# Patient Record
Sex: MALE | Race: OTHER | HISPANIC OR LATINO | Employment: FULL TIME | URBAN - METROPOLITAN AREA
[De-identification: names, ages, dates, MRNs, and addresses within clinical notes are randomized per-mention and may not be internally consistent; named-entity substitution may affect disease eponyms.]

---

## 2022-04-04 ENCOUNTER — OFFICE VISIT (OUTPATIENT)
Dept: FAMILY MEDICINE CLINIC | Facility: CLINIC | Age: 52
End: 2022-04-04

## 2022-04-04 VITALS
HEART RATE: 84 BPM | BODY MASS INDEX: 33.59 KG/M2 | TEMPERATURE: 98 F | DIASTOLIC BLOOD PRESSURE: 80 MMHG | OXYGEN SATURATION: 98 % | SYSTOLIC BLOOD PRESSURE: 130 MMHG | HEIGHT: 67 IN | WEIGHT: 214 LBS | RESPIRATION RATE: 20 BRPM

## 2022-04-04 DIAGNOSIS — G47.33 OSA (OBSTRUCTIVE SLEEP APNEA): ICD-10-CM

## 2022-04-04 DIAGNOSIS — Z00.01 ENCOUNTER FOR GENERAL ADULT MEDICAL EXAMINATION WITH ABNORMAL FINDINGS: Primary | ICD-10-CM

## 2022-04-04 DIAGNOSIS — Z12.11 COLON CANCER SCREENING: ICD-10-CM

## 2022-04-04 DIAGNOSIS — F52.4 PREMATURE EJACULATION: ICD-10-CM

## 2022-04-04 PROCEDURE — 99386 PREV VISIT NEW AGE 40-64: CPT | Performed by: FAMILY MEDICINE

## 2022-04-04 RX ORDER — ESCITALOPRAM OXALATE 5 MG/1
5 TABLET ORAL DAILY
Qty: 30 TABLET | Refills: 3 | Status: SHIPPED | OUTPATIENT
Start: 2022-04-04

## 2022-04-04 NOTE — PROGRESS NOTES
Assessment/Plan:    No problem-specific Assessment & Plan notes found for this encounter  Diagnoses and all orders for this visit:    Encounter for general adult medical examination with abnormal findings  -     CBC and differential; Future  -     Comprehensive metabolic panel; Future  -     Lipid panel; Future    BMI 33 0-33 9,adult    Colon cancer screening    Premature ejaculation  -     escitalopram (LEXAPRO) 5 mg tablet; Take 1 tablet (5 mg total) by mouth daily    PRECIOUS (obstructive sleep apnea)          Subjective:      Patient ID: Nicole Newman is a 46 y o  male  HPI  Patient is here for PE, not having any acute distress  The following portions of the patient's history were reviewed and updated as appropriate: allergies, current medications, past family history, past medical history, past social history, past surgical history and problem list     Review of Systems   Constitutional: Negative for diaphoresis, fatigue, fever and unexpected weight change  Respiratory: Negative for apnea, cough, choking, chest tightness and shortness of breath  Cardiovascular: Negative for chest pain, palpitations and leg swelling  Gastrointestinal: Negative for abdominal distention, abdominal pain, anal bleeding, blood in stool and constipation  Musculoskeletal: Negative for arthralgias, back pain, gait problem and joint swelling  Neurological: Negative for dizziness, facial asymmetry, light-headedness and headaches  Psychiatric/Behavioral: Negative for behavioral problems, dysphoric mood and self-injury  The patient is not nervous/anxious  Objective:      /80 (BP Location: Left arm, Patient Position: Sitting, Cuff Size: Standard)   Pulse 84   Temp 98 °F (36 7 °C) (Temporal)   Resp 20   Ht 5' 7" (1 702 m)   Wt 97 1 kg (214 lb)   SpO2 98%   BMI 33 52 kg/m²          Physical Exam  Vitals and nursing note reviewed  Neck:      Thyroid: No thyroid mass or thyromegaly        Vascular: No carotid bruit or JVD  Trachea: No tracheal tenderness  Comments: 18" NC  Cardiovascular:      Rate and Rhythm: Normal rate and regular rhythm  No extrasystoles are present  Pulses: Normal pulses  Heart sounds: Normal heart sounds  Heart sounds not distant  No friction rub  Pulmonary:      Effort: Pulmonary effort is normal  No tachypnea or bradypnea  Breath sounds: Normal breath sounds  No stridor  Abdominal:      General: Bowel sounds are normal  There is no abdominal bruit  Palpations: Abdomen is soft  There is no hepatomegaly or splenomegaly  Hernia: No hernia is present  Comments: 40" AC   Genitourinary:     Prostate: Normal    Musculoskeletal:         General: Normal range of motion  Cervical back: No edema or rigidity  Skin:     General: Skin is warm and dry  Neurological:      Mental Status: He is oriented to person, place, and time  Deep Tendon Reflexes: Reflexes are normal and symmetric  Psychiatric:         Behavior: Behavior normal          Thought Content: Thought content normal          Judgment: Judgment normal          BMI Counseling: Body mass index is 33 52 kg/m²  The BMI is above normal  Nutrition recommendations include decreasing soda and/or juice intake, moderation in carbohydrate intake and increasing intake of lean protein  Exercise recommendations include exercising 3-5 times per week

## 2023-04-24 ENCOUNTER — OFFICE VISIT (OUTPATIENT)
Dept: FAMILY MEDICINE CLINIC | Facility: CLINIC | Age: 53
End: 2023-04-24

## 2023-04-24 VITALS
HEIGHT: 67 IN | TEMPERATURE: 96.8 F | SYSTOLIC BLOOD PRESSURE: 138 MMHG | WEIGHT: 213 LBS | DIASTOLIC BLOOD PRESSURE: 80 MMHG | BODY MASS INDEX: 33.43 KG/M2 | OXYGEN SATURATION: 97 % | HEART RATE: 76 BPM

## 2023-04-24 DIAGNOSIS — Z72.0 TOBACCO USE: ICD-10-CM

## 2023-04-24 DIAGNOSIS — Z12.11 SCREENING FOR COLON CANCER: ICD-10-CM

## 2023-04-24 DIAGNOSIS — R03.0 ELEVATED BLOOD PRESSURE READING IN OFFICE WITHOUT DIAGNOSIS OF HYPERTENSION: ICD-10-CM

## 2023-04-24 DIAGNOSIS — Z13.6 SCREENING FOR CARDIOVASCULAR CONDITION: ICD-10-CM

## 2023-04-24 DIAGNOSIS — Z11.59 NEED FOR HEPATITIS C SCREENING TEST: ICD-10-CM

## 2023-04-24 DIAGNOSIS — Z12.5 SCREENING FOR MALIGNANT NEOPLASM OF PROSTATE: ICD-10-CM

## 2023-04-24 DIAGNOSIS — Z00.00 ANNUAL PHYSICAL EXAM: Primary | ICD-10-CM

## 2023-04-24 DIAGNOSIS — Z11.4 SCREENING FOR HIV (HUMAN IMMUNODEFICIENCY VIRUS): ICD-10-CM

## 2023-04-24 DIAGNOSIS — F52.4 PREMATURE EJACULATION: ICD-10-CM

## 2023-04-24 RX ORDER — ESCITALOPRAM OXALATE 10 MG/1
10 TABLET ORAL DAILY
Qty: 30 TABLET | Refills: 5 | Status: SHIPPED | OUTPATIENT
Start: 2023-04-24 | End: 2023-10-21

## 2023-04-24 NOTE — PROGRESS NOTES
ADULT ANNUAL [address] [facility] PRIMARY CARE [facility]    NAME: Derek Vila  AGE: 46 y o  SEX: male  : 1970     DATE: 2023     Assessment and Plan:     Problem List Items Addressed This Visit        Other    Elevated blood pressure reading in office without diagnosis of hypertension     Elevated BP on exam, improved with repeat, recommend smoking cessation and DASH diet, follow-up for BP check, suspect underlying HTN, recommend low salt diet as well, patient admits to high salt diet  Premature ejaculation     Previously started on Lexapro 5 mg with minimal improvement  Start trial of increased dose to 10 mg once daily, follow-up in 6-8 weeks if no improvement  Relevant Medications    escitalopram (LEXAPRO) 10 mg tablet    Other Relevant Orders    Testosterone, free, total    Tobacco use     Smokes hookah and cigars 2-3 times a week  Recommend smoking cessation, patient not agreeable as he does not smoke cigarettes  Re-address smoking cessation next visit  Other Visit Diagnoses     Annual physical exam    -  Primary    Screening for colon cancer        Relevant Orders    Occult Blood, Fecal Immunochemical    Screening for cardiovascular condition        Relevant Orders    CBC and differential    Comprehensive metabolic panel    : HIV 1/2 AB/AG w Reflex [facility] for 2 yr old and above    Hepatitis C antibody    Lipid panel    Occult Blood, Fecal Immunochemical    Need for hepatitis C screening test        Relevant Orders    Hepatitis C antibody    Screening for HIV (human immunodeficiency virus)        Relevant Orders    : HIV 1/2 AB/AG w Reflex [facility] for 2 yr old and above    Screening for malignant neoplasm of prostate        Relevant Orders    PSA, Total Screen          Immunizations and preventive care screenings were discussed with patient today   Appropriate education was printed on patient's after visit summary  Discussed risks and benefits of prostate cancer screening  We discussed the controversial history of PSA screening for prostate cancer in the [place] as well as the risk of over detection and over treatment of prostate cancer by way of PSA screening  The patient understands that PSA blood testing is an imperfect way to screen for prostate cancer and that elevated PSA levels in the blood may also be caused by infection, inflammation, prostatic trauma or manipulation, urological procedures, or by benign prostatic enlargement  The role of the digital rectal examination in prostate cancer screening was also discussed and I discussed with him that there is large interobserver variability in the findings of digital rectal examination  Counseling:  Alcohol/drug use: discussed moderation in alcohol intake, the recommendations for healthy alcohol use, and avoidance of illicit drug use  Dental Health: discussed importance of regular tooth brushing, flossing, and dental visits  Injury prevention: discussed safety/seat belts, safety helmets, smoke detectors, carbon dioxide detectors, and smoking near bedding or upholstery  Sexual health: discussed sexually transmitted diseases, partner selection, use of condoms, avoidance of unintended pregnancy, and contraceptive alternatives  · Exercise: the importance of regular exercise/physical activity was discussed  Recommend exercise 3-5 times per week for at least 30 minutes  Return in about 6 months (around 10/24/2023)  Chief Complaint:     Chief Complaint   Patient presents with   • Physical Exam      History of Present Illness:     Adult Annual Physical   Patient here for a comprehensive physical exam  The patient reports no problems  Snoring a lot a night  Not tired during the day  Noticed by wife but does not bother him  R hand in the morning painful  OTC analgesics help  Working in [employer]  Smoking hookah, cigars 2-3x a week   No cigarettes, never smoked them  Working long hours as boss at [employer]  Does not have insurance but willing to pay for things out of pocket, wants natural remedy for low testosterone? No frequent ETOH use  History was conducted in Latvian without the use of   Diet and Physical Activity  · Diet/Nutrition: well balanced diet  Rice, beans, high salt, salad  · Exercise: no formal exercise  Depression Screening  PHQ-2/9 Depression Screening    Little interest or pleasure in doing things: 0 - not at all  Feeling down, depressed, or hopeless: 0 - not at all  PHQ-2 Score: 0  PHQ-2 Interpretation: Negative depression screen       General Health  · Sleep: gets 4-6 hours of sleep on average  · Hearing: normal - bilateral   · Vision: wears glasses  · Dental: brushes teeth twice daily   Health  · Symptoms include: erectile dysfunction     Review of Systems:     Review of Systems   Constitutional: Negative for chills and fever  HENT: Negative for ear pain and sore throat  Eyes: Negative for pain and visual disturbance  Respiratory: Negative for cough and shortness of breath  Cardiovascular: Negative for chest pain and palpitations  Gastrointestinal: Negative for abdominal pain and vomiting  Genitourinary: Negative for dysuria and hematuria  Musculoskeletal: Positive for arthralgias  Negative for back pain and neck pain  Skin: Negative for color change and rash  Neurological: Negative for seizures and syncope  All other systems reviewed and are negative  Past Medical History:     Past Medical History:   Diagnosis Date   • Allergic    • Hypertension    • Obesity       Past Surgical History:     History reviewed  No pertinent surgical history     Family History:     Family History   Problem Relation Age of Onset   • Cancer Mother    • Hypertension Father       Social History:     Social History     Socioeconomic History   • Marital status: /Civil Union "Spouse name: None   • Number of children: None   • Years of education: None   • Highest education level: None   Occupational History   • None   Tobacco Use   • Smoking status: Every Day     Types: Cigars     Start date: 4/21/2020   • Smokeless tobacco: Never   Substance and Sexual Activity   • Alcohol use: Yes   • Drug use: Never   • Sexual activity: Yes     Partners: Female   Other Topics Concern   • None   Social History Narrative   • None     Social Determinants of Health     Financial Resource Strain: Not on file   Food Insecurity: Not on file   Transportation Needs: Not on file   Physical Activity: Not on file   Stress: Not on file   Social Connections: Not on file   Intimate Partner Violence: Not on file   Housing Stability: Not on file      Current Medications:     Current Outpatient Medications   Medication Sig Dispense Refill   • escitalopram (LEXAPRO) 10 mg tablet Take 1 tablet (10 mg total) by mouth daily Candy 1 tableta cada audrey 30 tablet 5     No current facility-administered medications for this visit  Allergies:     No Known Allergies   Physical Exam:     /80 (BP Location: Left arm, Patient Position: Sitting, Cuff Size: Large)   Pulse 76   Temp (!) 96 8 °F (36 °C) (Tympanic)   Ht 5' 7\" (1 702 m)   Wt 96 6 kg (213 lb)   SpO2 97%   BMI 33 36 kg/m²     Physical Exam  Vitals and nursing note reviewed  Constitutional:       General: He is not in acute distress  Appearance: He is well-developed  He is obese  HENT:      Head: Normocephalic and atraumatic  Right Ear: Tympanic membrane, ear canal and external ear normal       Left Ear: Tympanic membrane, ear canal and external ear normal    Eyes:      Extraocular Movements: Extraocular movements intact  Conjunctiva/sclera: Conjunctivae normal       Pupils: Pupils are equal, round, and reactive to light  Cardiovascular:      Rate and Rhythm: Normal rate and regular rhythm  Heart sounds: No murmur heard    Pulmonary:      " Effort: Pulmonary effort is normal  No respiratory distress  Breath sounds: Normal breath sounds  Abdominal:      Palpations: Abdomen is soft  Musculoskeletal:         General: No swelling  Cervical back: Neck supple  Skin:     General: Skin is warm and dry  Capillary Refill: Capillary refill takes less than 2 seconds  Neurological:      Mental Status: He is alert     Psychiatric:         Mood and Affect: Mood normal           Juana Doyle PA-C  [address]

## 2023-04-25 NOTE — ASSESSMENT & PLAN NOTE
Smokes hookah and cigars 2-3 times a week  Recommend smoking cessation, patient not agreeable as he does not smoke cigarettes  Re-address smoking cessation next visit

## 2023-04-25 NOTE — ASSESSMENT & PLAN NOTE
Previously started on Lexapro 5 mg with minimal improvement  Start trial of increased dose to 10 mg once daily, follow-up in 6-8 weeks if no improvement

## 2023-04-25 NOTE — ASSESSMENT & PLAN NOTE
Elevated BP on exam, improved with repeat, recommend smoking cessation and DASH diet, follow-up for BP check, suspect underlying HTN, recommend low salt diet as well, patient admits to high salt diet

## 2023-05-06 ENCOUNTER — APPOINTMENT (OUTPATIENT)
Dept: LAB | Facility: HOSPITAL | Age: 53
End: 2023-05-06

## 2023-05-06 DIAGNOSIS — Z12.5 SCREENING FOR MALIGNANT NEOPLASM OF PROSTATE: ICD-10-CM

## 2023-05-06 DIAGNOSIS — F52.4 PREMATURE EJACULATION: ICD-10-CM

## 2023-05-06 DIAGNOSIS — Z13.6 SCREENING FOR CARDIOVASCULAR CONDITION: ICD-10-CM

## 2023-05-06 DIAGNOSIS — Z11.59 NEED FOR HEPATITIS C SCREENING TEST: ICD-10-CM

## 2023-05-06 DIAGNOSIS — Z11.4 SCREENING FOR HIV (HUMAN IMMUNODEFICIENCY VIRUS): ICD-10-CM

## 2023-05-06 LAB
ALBUMIN SERPL BCP-MCNC: 4.5 G/DL (ref 3.5–5)
ALP SERPL-CCNC: 44 U/L (ref 34–104)
ALT SERPL W P-5'-P-CCNC: 17 U/L (ref 7–52)
ANION GAP SERPL CALCULATED.3IONS-SCNC: 8 MMOL/L (ref 4–13)
AST SERPL W P-5'-P-CCNC: 16 U/L (ref 13–39)
BASOPHILS # BLD AUTO: 0.02 THOUSANDS/ÂΜL (ref 0–0.1)
BASOPHILS NFR BLD AUTO: 0 % (ref 0–1)
BILIRUB SERPL-MCNC: 0.88 MG/DL (ref 0.2–1)
BUN SERPL-MCNC: 11 MG/DL (ref 5–25)
CALCIUM SERPL-MCNC: 9 MG/DL (ref 8.4–10.2)
CHLORIDE SERPL-SCNC: 102 MMOL/L (ref 96–108)
CHOLEST SERPL-MCNC: 201 MG/DL
CO2 SERPL-SCNC: 29 MMOL/L (ref 21–32)
CREAT SERPL-MCNC: 0.75 MG/DL (ref 0.6–1.3)
EOSINOPHIL # BLD AUTO: 0.11 THOUSAND/ÂΜL (ref 0–0.61)
EOSINOPHIL NFR BLD AUTO: 2 % (ref 0–6)
ERYTHROCYTE [DISTWIDTH] IN BLOOD BY AUTOMATED COUNT: 12.6 % (ref 11.6–15.1)
GFR SERPL CREATININE-BSD FRML MDRD: 105 ML/MIN/1.73SQ M
GLUCOSE P FAST SERPL-MCNC: 105 MG/DL (ref 65–99)
HCT VFR BLD AUTO: 45.4 % (ref 36.5–49.3)
HCV AB SER QL: NORMAL
HDLC SERPL-MCNC: 43 MG/DL
HGB BLD-MCNC: 15.5 G/DL (ref 12–17)
HIV 1+2 AB+HIV1 P24 AG SERPL QL IA: NORMAL
HIV 2 AB SERPL QL IA: NORMAL
HIV1 AB SERPL QL IA: NORMAL
HIV1 P24 AG SERPL QL IA: NORMAL
IMM GRANULOCYTES # BLD AUTO: 0.01 THOUSAND/UL (ref 0–0.2)
IMM GRANULOCYTES NFR BLD AUTO: 0 % (ref 0–2)
LDLC SERPL CALC-MCNC: 130 MG/DL (ref 0–100)
LYMPHOCYTES # BLD AUTO: 1.4 THOUSANDS/ÂΜL (ref 0.6–4.47)
LYMPHOCYTES NFR BLD AUTO: 27 % (ref 14–44)
MCH RBC QN AUTO: 31.3 PG (ref 26.8–34.3)
MCHC RBC AUTO-ENTMCNC: 34.1 G/DL (ref 31.4–37.4)
MCV RBC AUTO: 92 FL (ref 82–98)
MONOCYTES # BLD AUTO: 0.55 THOUSAND/ÂΜL (ref 0.17–1.22)
MONOCYTES NFR BLD AUTO: 11 % (ref 4–12)
NEUTROPHILS # BLD AUTO: 3.1 THOUSANDS/ÂΜL (ref 1.85–7.62)
NEUTS SEG NFR BLD AUTO: 60 % (ref 43–75)
NONHDLC SERPL-MCNC: 158 MG/DL
NRBC BLD AUTO-RTO: 0 /100 WBCS
PLATELET # BLD AUTO: 256 THOUSANDS/UL (ref 149–390)
PMV BLD AUTO: 10.2 FL (ref 8.9–12.7)
POTASSIUM SERPL-SCNC: 4.6 MMOL/L (ref 3.5–5.3)
PROT SERPL-MCNC: 7.1 G/DL (ref 6.4–8.4)
PSA SERPL-MCNC: 0.8 NG/ML (ref 0–4)
RBC # BLD AUTO: 4.95 MILLION/UL (ref 3.88–5.62)
SODIUM SERPL-SCNC: 139 MMOL/L (ref 135–147)
TRIGL SERPL-MCNC: 141 MG/DL
WBC # BLD AUTO: 5.19 THOUSAND/UL (ref 4.31–10.16)

## 2023-05-08 LAB
TESTOST FREE SERPL-MCNC: 8.3 PG/ML (ref 7.2–24)
TESTOST SERPL-MCNC: 287 NG/DL (ref 264–916)

## 2024-04-29 ENCOUNTER — OFFICE VISIT (OUTPATIENT)
Dept: FAMILY MEDICINE CLINIC | Facility: CLINIC | Age: 54
End: 2024-04-29

## 2024-04-29 ENCOUNTER — LAB (OUTPATIENT)
Dept: LAB | Facility: HOSPITAL | Age: 54
End: 2024-04-29

## 2024-04-29 VITALS
BODY MASS INDEX: 34.69 KG/M2 | TEMPERATURE: 97.9 F | RESPIRATION RATE: 16 BRPM | WEIGHT: 221 LBS | HEART RATE: 80 BPM | HEIGHT: 67 IN | DIASTOLIC BLOOD PRESSURE: 90 MMHG | SYSTOLIC BLOOD PRESSURE: 156 MMHG | OXYGEN SATURATION: 98 %

## 2024-04-29 DIAGNOSIS — I15.8 OTHER SECONDARY HYPERTENSION: ICD-10-CM

## 2024-04-29 DIAGNOSIS — F52.4 PREMATURE EJACULATION: ICD-10-CM

## 2024-04-29 DIAGNOSIS — Z12.11 COLON CANCER SCREENING: ICD-10-CM

## 2024-04-29 DIAGNOSIS — Z00.01 ENCOUNTER FOR GENERAL ADULT MEDICAL EXAMINATION WITH ABNORMAL FINDINGS: Primary | ICD-10-CM

## 2024-04-29 DIAGNOSIS — R06.83 SNORING: ICD-10-CM

## 2024-04-29 LAB — HEMOCCULT STL QL IA: NEGATIVE

## 2024-04-29 PROCEDURE — G0328 FECAL BLOOD SCRN IMMUNOASSAY: HCPCS

## 2024-04-29 PROCEDURE — 99396 PREV VISIT EST AGE 40-64: CPT | Performed by: FAMILY MEDICINE

## 2024-04-29 RX ORDER — ESCITALOPRAM OXALATE 5 MG/1
5 TABLET ORAL DAILY
Qty: 90 TABLET | Refills: 3 | Status: SHIPPED | OUTPATIENT
Start: 2024-04-29 | End: 2025-04-24

## 2024-04-29 RX ORDER — ESCITALOPRAM OXALATE 10 MG/1
10 TABLET ORAL DAILY
Qty: 30 TABLET | Refills: 5 | Status: SHIPPED | OUTPATIENT
Start: 2024-04-29 | End: 2024-04-29 | Stop reason: SDUPTHER

## 2024-04-29 NOTE — PROGRESS NOTES
"Name: Rodger Gates      : 1970      MRN: [id number]  Encounter Provider: Juan José Burgos MD  Encounter Date: 2024   Encounter department: [facility] PRIMARY CARE [facility]    Subjective     Chief Complaint  Patient presents for a routine physical examination with concerns about high blood pressure and weight management.    History of Present Illness  54-year-old patient returns for a follow-up visit. Noted elevated blood pressure readings, with the most recent being 156/90 mmHg. Previous readings include 138/80 mmHg last year and 130/80 mmHg in 2022. The patient has gained approximately 8 pounds since the last visit, which may be contributing to the increased blood pressure. Reports symptoms suggestive of sleep apnea, including snoring and nocturnal awakenings. The patient also mentions frequent urination at night and high levels of stress due to work.    Medications  Patient is not currently on any antihypertensive medications.    Allergies  No known drug allergies.    Past Medical History  History of elevated blood pressure.    Past Surgical History  No surgical history reported.    Social History  Works in web development. High stress levels reported. No tobacco, alcohol, or illicit drug use mentioned.    Family History  No family history provided.    Review of Systems  Positive for nocturia and stress. Negative for chest pain, shortness of breath, and diabetes symptoms.    Vital Signs  /90 (BP Location: Left arm, Patient Position: Sitting, Cuff Size: Standard)   Pulse 80   Temp 97.9 °F (36.6 °C) (Tympanic)   Resp 16   Ht 5' 7\" (1.702 m)   Wt 100 kg (221 lb)   SpO2 98%   BMI 34.61 kg/m²       Physical Exam  Patient is obese with neck circumference of 19-1/2 inches.  Large tongue and narrowed throat .  The heart has normal rhythm without murmurs, lungs are clear to disposition.  Abdomen is protuberant but soft.  Legs no edema.  Rectal exam prostate is " "normal.      Assessment and Plan     1. Encounter for general adult medical examination with abnormal findings  -     CBC and differential; Future  -     Comprehensive metabolic panel; Future  -     Lipid Panel with Direct LDL reflex; Future    2. Other secondary hypertension    3. Snoring (suspect sleep apnea)    4. Premature ejaculation  -     escitalopram (LEXAPRO) 5 mg tablet; Take 1 tablet (5 mg total) by mouth daily Candy 1 tableta cada audrey    5. Colon cancer screening  -     Occult Blood, Fecal Immunochemical; Future        Hypertension: Encourage lifestyle modifications including dietary changes with reduced salt intake, increased physical activity with a goal of 15 minutes of walking daily, and weight loss. Monitor blood pressure weekly and initiate antihypertensive medication if systolic blood pressure consistently exceeds 160 mmHg.  Weight Management: Advise patient to maintain a stable weight and consider nutritional counseling to assist with weight loss. Target weight based on height (5'9\") is 190 pounds.  Suspect Sleep Apnea: Given the patient's symptoms of snoring and nocturnal awakenings, recommend a sleep study to evaluate for obstructive sleep apnea, which may be contributing to hypertension.  Prostate Health: Patient reports no issues with prostate health but will perform a digital rectal exam and consider PSA testing based on age and symptoms.  Colon cancer screening needed.  Recommended to use Fit .  Sexual Health: Patient is using escitalopram 10 mg for hematuria prevention but he wants to decrease it to 5 mg.          Juan José Burgos MD   [facility] PRIMARY CARE [facility]"

## 2024-05-02 NOTE — RESULT ENCOUNTER NOTE
Estimado Rodger: dumont prueba de detección de cáncer de colon realizada recientemente salio Negativa, esto significa que no hay signos de cáncer de colon. Esta prueba es válida por 1 año.  Dr. Burgos

## 2025-03-25 ENCOUNTER — TELEPHONE (OUTPATIENT)
Age: 55
End: 2025-03-25

## 2025-03-25 NOTE — TELEPHONE ENCOUNTER
JUN Used- Luanne- [id number]    Pt is requesting the office visit summary from his last appt to be sent to him in a mychart attachment. He is unable to print the summary from his mychart.    Please advise

## 2025-05-05 ENCOUNTER — OFFICE VISIT (OUTPATIENT)
Dept: FAMILY MEDICINE CLINIC | Facility: CLINIC | Age: 55
End: 2025-05-05

## 2025-05-05 VITALS
BODY MASS INDEX: 34.21 KG/M2 | TEMPERATURE: 98.1 F | SYSTOLIC BLOOD PRESSURE: 130 MMHG | OXYGEN SATURATION: 98 % | RESPIRATION RATE: 16 BRPM | HEIGHT: 67 IN | WEIGHT: 218 LBS | DIASTOLIC BLOOD PRESSURE: 90 MMHG | HEART RATE: 78 BPM

## 2025-05-05 DIAGNOSIS — R73.03 PRE-DIABETES: ICD-10-CM

## 2025-05-05 DIAGNOSIS — L30.9 ECZEMA, UNSPECIFIED TYPE: ICD-10-CM

## 2025-05-05 DIAGNOSIS — N52.9 ERECTILE DYSFUNCTION, UNSPECIFIED ERECTILE DYSFUNCTION TYPE: ICD-10-CM

## 2025-05-05 DIAGNOSIS — Z12.11 COLON CANCER SCREENING: ICD-10-CM

## 2025-05-05 DIAGNOSIS — Z00.01 ENCOUNTER FOR GENERAL ADULT MEDICAL EXAMINATION WITH ABNORMAL FINDINGS: Primary | ICD-10-CM

## 2025-05-05 DIAGNOSIS — F52.4 PREMATURE EJACULATION: ICD-10-CM

## 2025-05-05 PROCEDURE — 99396 PREV VISIT EST AGE 40-64: CPT | Performed by: FAMILY MEDICINE

## 2025-05-05 RX ORDER — ESCITALOPRAM OXALATE 10 MG/1
10 TABLET ORAL DAILY
Qty: 90 TABLET | Refills: 3 | Status: SHIPPED | OUTPATIENT
Start: 2025-05-05 | End: 2026-04-30

## 2025-05-05 RX ORDER — DESOXIMETASONE 2.5 MG/G
CREAM TOPICAL 2 TIMES DAILY
Qty: 60 G | Refills: 2 | Status: SHIPPED | OUTPATIENT
Start: 2025-05-05

## 2025-05-05 NOTE — PROGRESS NOTES
Name: Rodger Gates      : 1970      MRN: [id number]  Encounter Provider: Juan José Burgos MD  Encounter Date: 2025   Encounter department: [facility] CARE [facility]    Assessment & Plan  Encounter for general adult medical examination with abnormal findings  Rodger is here for a physical exam. I found him without any distress. The patient has the following chronic conditions   Patient Active Problem List   Diagnosis    Premature ejaculation    Tobacco use    Elevated blood pressure reading in office without diagnosis of hypertension   .   I recommended that He exercise for at least 3.5 hours each week and maintain a healthy diet low in carbohydrates and saturated fats. I provided her with information about lifestyle modifications. The patient understands the importance of having an annual physical exam.       Suspect Sleep Apnea  Narrow pharynx and neck circumference of 19 inches indicate increased risk for sleep apnea. Home sleep study to evaluate severity.  Treatment plan: Advised to reduce alcohol intake and engage in daily physical activity for at least 15 minutes. If moderate to severe sleep apnea, recommend CPAP machine.    Discontinued order due to lack of insurance.    . Medication Management  Refill for 0.25% skin cream in a 10-gram tube. Increased PE medication dosage to 10 mg for regular use.     Health Maintenance  Advised to lose weight from 218 pounds to below 190 pounds to alleviate snoring. Testosterone level test before 9 AM on Wednesday.  Orders:    CBC and differential; Future    Comprehensive metabolic panel; Future    Lipid Panel with Direct LDL reflex; Future    Pre-diabetes    Orders:    Hemoglobin A1C; Future    Colon cancer screening    Orders:    iFOBT/FIT; Future        Subjective     History of Present Illness  The patient is a 54-year-old male presenting for an annual physical.    He reports no significant health issues, headaches, chest pain,  "constipation, or urinary difficulties. No recent family bereavements. His mother  from uterine cancer, and his father  of natural causes in his 70s. He has 3 children aged 6, 14, and 25. He exercises regularly and consumes alcohol socially, with occasional heavy drinking at night.    He reports frequent snoring and persistent fatigue upon waking, attributed to his work schedule. His wife has observed apnea episodes during sleep.    He uses a 0.25% cream for skin issues, which is effective. He continues Premature Ejaculation (PE) medication and is interested in increasing the dosage to 10 mg daily. No testicular or penile abnormalities, including abnormal discharge. Erectile function is satisfactory, with a slight penile curvature.    SOCIAL HISTORY  Drinks alcohol socially and sometimes heavily at night.    FAMILY HISTORY  Mother  from uterine cancer. Father  of natural causes in his 70s.    MEDICATIONS  Current: Premature Ejaculation (PE) medication    Results       No Known Allergies  Patient Active Problem List   Diagnosis    Premature ejaculation    Tobacco use    Elevated blood pressure reading in office without diagnosis of hypertension       Current Outpatient Medications:     escitalopram (LEXAPRO) 5 mg tablet, Take 1 tablet (5 mg total) by mouth daily Candy 1 tableta cada audrey, Disp: 90 tablet, Rfl: 3    /90 (BP Location: Left arm, Patient Position: Sitting, Cuff Size: Standard)   Pulse 78   Temp 98.1 °F (36.7 °C) (Tympanic)   Resp 16   Ht 5' 7\" (1.702 m)   Wt 98.9 kg (218 lb)   SpO2 98%   BMI 34.14 kg/m²     Physical Exam  General Appearance: Normal.  HEENT: Ears and mouth normal. Narrow pharynx.  Respiratory: Lungs clear through the auscultation.  Cardiovascular: Heart has Normal rhythm without murmurs.  Gastrointestinal: Rectal: Prostate normal size.  Genitourinary: Male: No abnormalities noted.  Lymphatic: No abnormal lymph nodes found.  Extremities: No edema in the " LEs.  Skin: Warm and dry, no rash.  Neurological: Normal.  Other observations: Neck circumference 19 inches. Weight: 218 pounds.